# Patient Record
Sex: FEMALE | Race: WHITE | Employment: FULL TIME | URBAN - METROPOLITAN AREA
[De-identification: names, ages, dates, MRNs, and addresses within clinical notes are randomized per-mention and may not be internally consistent; named-entity substitution may affect disease eponyms.]

---

## 2017-01-24 PROBLEM — B99.9 INFECTION: Status: ACTIVE | Noted: 2017-01-24

## 2017-01-24 PROBLEM — L97.919 STASIS ULCER OF LOWER EXTREMITY, RIGHT (HCC): Status: ACTIVE | Noted: 2017-01-24

## 2017-01-24 PROBLEM — L03.115 CELLULITIS OF LEG WITHOUT FOOT, RIGHT: Status: ACTIVE | Noted: 2017-01-24

## 2017-01-24 PROBLEM — I83.019 STASIS ULCER OF LOWER EXTREMITY, RIGHT (HCC): Status: ACTIVE | Noted: 2017-01-24

## 2017-01-24 PROBLEM — F17.210 CIGARETTE SMOKER ONE HALF PACK A DAY OR LESS: Status: ACTIVE | Noted: 2017-01-24

## 2017-01-24 PROBLEM — L90.5: Status: ACTIVE | Noted: 2017-01-24

## 2017-01-31 PROBLEM — L97.222 ULCER OF LEFT CALF WITH FAT LAYER EXPOSED (HCC): Status: ACTIVE | Noted: 2017-01-31

## 2017-02-22 ENCOUNTER — LAB ENCOUNTER (OUTPATIENT)
Dept: LAB | Age: 67
End: 2017-02-22
Attending: INTERNAL MEDICINE
Payer: COMMERCIAL

## 2017-02-22 DIAGNOSIS — I83.019 STASIS ULCER OF LOWER EXTREMITY, RIGHT (HCC): ICD-10-CM

## 2017-02-22 DIAGNOSIS — L97.919 STASIS ULCER OF LOWER EXTREMITY, RIGHT (HCC): ICD-10-CM

## 2017-02-22 DIAGNOSIS — L97.222 ULCER OF LEFT CALF WITH FAT LAYER EXPOSED (HCC): ICD-10-CM

## 2017-02-22 DIAGNOSIS — L90.5 FIBROSIS OF SKIN OF LOWER EXTREMITY: ICD-10-CM

## 2017-02-22 DIAGNOSIS — L03.115 CELLULITIS OF LEG WITHOUT FOOT, RIGHT: ICD-10-CM

## 2017-02-22 PROCEDURE — 87075 CULTR BACTERIA EXCEPT BLOOD: CPT

## 2017-02-22 PROCEDURE — 87070 CULTURE OTHR SPECIMN AEROBIC: CPT

## 2017-02-22 PROCEDURE — 87147 CULTURE TYPE IMMUNOLOGIC: CPT

## 2017-02-22 PROCEDURE — 87205 SMEAR GRAM STAIN: CPT

## 2017-02-22 PROCEDURE — 87186 SC STD MICRODIL/AGAR DIL: CPT

## 2017-03-08 PROBLEM — A49.8 PSEUDOMONAS AERUGINOSA INFECTION: Status: ACTIVE | Noted: 2017-03-08

## 2017-06-06 PROBLEM — L97.312 ULCER OF RIGHT ANKLE, WITH FAT LAYER EXPOSED (HCC): Status: ACTIVE | Noted: 2017-06-06

## 2017-06-22 ENCOUNTER — LAB ENCOUNTER (OUTPATIENT)
Dept: LAB | Age: 67
End: 2017-06-22
Attending: NURSE PRACTITIONER
Payer: COMMERCIAL

## 2017-06-22 DIAGNOSIS — L03.119 CELLULITIS AND ABSCESS OF LEG: ICD-10-CM

## 2017-06-22 DIAGNOSIS — L02.419 CELLULITIS AND ABSCESS OF LEG: ICD-10-CM

## 2017-06-22 PROCEDURE — 87075 CULTR BACTERIA EXCEPT BLOOD: CPT

## 2017-06-22 PROCEDURE — 87070 CULTURE OTHR SPECIMN AEROBIC: CPT

## 2017-06-22 PROCEDURE — 87147 CULTURE TYPE IMMUNOLOGIC: CPT

## 2017-06-22 PROCEDURE — 87186 SC STD MICRODIL/AGAR DIL: CPT

## 2017-06-22 PROCEDURE — 87205 SMEAR GRAM STAIN: CPT

## 2017-08-24 ENCOUNTER — LAB ENCOUNTER (OUTPATIENT)
Dept: LAB | Age: 67
End: 2017-08-24
Attending: NURSE PRACTITIONER
Payer: COMMERCIAL

## 2017-08-24 DIAGNOSIS — L03.818 CELLULITIS OF OTHER SPECIFIED SITE: ICD-10-CM

## 2017-08-24 PROCEDURE — 87205 SMEAR GRAM STAIN: CPT

## 2017-08-24 PROCEDURE — 87070 CULTURE OTHR SPECIMN AEROBIC: CPT

## 2017-08-24 PROCEDURE — 87186 SC STD MICRODIL/AGAR DIL: CPT

## 2017-08-24 PROCEDURE — 87077 CULTURE AEROBIC IDENTIFY: CPT

## 2017-08-24 PROCEDURE — 87147 CULTURE TYPE IMMUNOLOGIC: CPT

## 2017-08-24 PROCEDURE — 87075 CULTR BACTERIA EXCEPT BLOOD: CPT

## 2018-08-28 ENCOUNTER — HOSPITAL (OUTPATIENT)
Dept: OTHER | Age: 68
End: 2018-08-28
Attending: INTERNAL MEDICINE

## 2018-08-29 ENCOUNTER — CHARTING TRANS (OUTPATIENT)
Dept: OTHER | Age: 68
End: 2018-08-29

## 2018-09-01 ENCOUNTER — HOSPITAL (OUTPATIENT)
Dept: OTHER | Age: 68
End: 2018-09-01
Attending: SURGERY

## 2018-09-07 ENCOUNTER — LAB SERVICES (OUTPATIENT)
Dept: OTHER | Age: 68
End: 2018-09-07

## 2018-09-13 LAB
ANER/AEROBE CUL/SMR (AANC) HL: NORMAL
CEFEPIME: NORMAL
CEFTAZIDIME: NORMAL
CEFTRIAXONE SUSC ISLT: NORMAL
CIPROFLOXACIN SUSC ISLT: >0.5
CIPROFLOXACIN SUSC ISLT: NORMAL
ERYTHROMYCIN SUSC ISLT: >0.5
GENTAMICIN: NORMAL
IMIPENEM SUSC ISLT: NORMAL
LEVOFLOXACIN SUSC ISLT: NORMAL
MEROPENEM: NORMAL
PENICILLIN G SUSC ISLT: NORMAL
PIP+TAZO SUSC ISLT: NORMAL
TMP SMX SUSC ISLT: NORMAL
TOBRAMYCIN: NORMAL
VANCOMYCIN SUSC ISLT: NORMAL

## 2019-01-01 ENCOUNTER — TELEPHONE (OUTPATIENT)
Dept: WOUND CARE | Facility: HOSPITAL | Age: 69
End: 2019-01-01

## 2019-01-01 ENCOUNTER — OFFICE VISIT (OUTPATIENT)
Dept: WOUND CARE | Facility: HOSPITAL | Age: 69
End: 2019-01-01
Attending: INTERNAL MEDICINE
Payer: COMMERCIAL

## 2019-01-01 ENCOUNTER — APPOINTMENT (OUTPATIENT)
Dept: WOUND CARE | Facility: HOSPITAL | Age: 69
End: 2019-01-01
Attending: CLINICAL NURSE SPECIALIST
Payer: COMMERCIAL

## 2019-01-01 DIAGNOSIS — L97.222 ULCER OF LEFT CALF WITH FAT LAYER EXPOSED (HCC): ICD-10-CM

## 2019-01-01 DIAGNOSIS — L97.919 STASIS ULCER OF LOWER EXTREMITY, RIGHT (HCC): Primary | ICD-10-CM

## 2019-01-01 DIAGNOSIS — I83.019 STASIS ULCER OF LOWER EXTREMITY, RIGHT (HCC): Primary | ICD-10-CM

## 2019-01-01 PROCEDURE — 87070 CULTURE OTHR SPECIMN AEROBIC: CPT | Performed by: NURSE PRACTITIONER

## 2019-01-01 PROCEDURE — 87205 SMEAR GRAM STAIN: CPT | Performed by: NURSE PRACTITIONER

## 2019-01-01 PROCEDURE — 97598 DBRDMT OPN WND ADDL 20CM/<: CPT

## 2019-01-01 PROCEDURE — 87075 CULTR BACTERIA EXCEPT BLOOD: CPT | Performed by: NURSE PRACTITIONER

## 2019-01-01 PROCEDURE — 99214 OFFICE O/P EST MOD 30 MIN: CPT

## 2019-01-01 PROCEDURE — 87077 CULTURE AEROBIC IDENTIFY: CPT | Performed by: NURSE PRACTITIONER

## 2019-01-01 PROCEDURE — 87184 SC STD DISK METHOD PER PLATE: CPT | Performed by: NURSE PRACTITIONER

## 2019-01-01 PROCEDURE — 97597 DBRDMT OPN WND 1ST 20 CM/<: CPT

## 2019-01-01 PROCEDURE — 87186 SC STD MICRODIL/AGAR DIL: CPT | Performed by: NURSE PRACTITIONER

## 2019-02-06 NOTE — PROGRESS NOTES
If wounds not improving on PO cefdinir, then likely switch to PO cipro vs other. If improving these are likely colonizers.
06-Feb-2019

## 2019-03-19 PROCEDURE — 87075 CULTR BACTERIA EXCEPT BLOOD: CPT | Performed by: INTERNAL MEDICINE

## 2019-03-19 PROCEDURE — 87077 CULTURE AEROBIC IDENTIFY: CPT | Performed by: INTERNAL MEDICINE

## 2019-03-19 PROCEDURE — 87070 CULTURE OTHR SPECIMN AEROBIC: CPT | Performed by: INTERNAL MEDICINE

## 2019-03-19 PROCEDURE — 87205 SMEAR GRAM STAIN: CPT | Performed by: INTERNAL MEDICINE

## 2019-03-19 PROCEDURE — 87186 SC STD MICRODIL/AGAR DIL: CPT | Performed by: INTERNAL MEDICINE

## 2019-03-19 PROCEDURE — 87076 CULTURE ANAEROBE IDENT EACH: CPT | Performed by: INTERNAL MEDICINE

## 2019-04-19 ENCOUNTER — APPOINTMENT (OUTPATIENT)
Dept: WOUND CARE | Facility: HOSPITAL | Age: 69
End: 2019-04-19
Attending: CLINICAL NURSE SPECIALIST
Payer: COMMERCIAL

## 2019-05-03 ENCOUNTER — APPOINTMENT (OUTPATIENT)
Dept: WOUND CARE | Facility: HOSPITAL | Age: 69
End: 2019-05-03
Attending: INTERNAL MEDICINE
Payer: COMMERCIAL

## 2019-05-31 NOTE — PROGRESS NOTES
Subjective    Chief Complaint  This information was obtained from the patient  The patient is new to the [address] [facility],[address] here for an initial visit for the evaluation and management of non-healing wound(s).     Allergies  no known allergies    HPI  This informa Constitutional Symptoms (General Health):  Fatigue, Fever, Weakness  Respiratory: Cough, Shortness of Breath, Wheezing  Cardiovascular (Central/Peripheral): Chest Pain  Gastrointestinal (GI): Change in Bowel Habits  Genitourinary (): Urinary Incontinence Wound #1 Right, Anterior Lower Leg is a chronic Full Thickness Venous Ulcer and has received a status of Not Healed.  Initial wound encounter measurements are 5.6cm length x 4.7cm width x 0.1cm depth, with an area of 26.32 sq cm and a volume of 2.632 cubic Wound #3 Left, Dorsal Foot (mid) is a chronic Full Thickness Venous Ulcer and has received a status of Not Healed. Initial wound encounter measurements are 2.6cm length x 2cm width x 0.1cm depth, with an area of 5.2 sq cm and a volume of 0.52 cubic cm.  The (Encounter Diagnosis) S81.802D - Unspecified open wound, left lower leg, subsequent encounter  (Encounter Diagnosis) I87.323 - Chronic venous hypertension (idiopathic) with inflammation of bilateral lower extremity  (Encounter Diagnosis) I87.2 - Venous ins Wound #1 (Venous Ulcer) is located on the right, anterior lower leg. A selective debridement with a total area debrided of 5.264 sq cm was performed by Sharmila Mancini NP. to remove devitalized tissue: biofilm, fibrin, and slough.  The following instrument(s Wound #2 (Venous Ulcer) is located on the left lower leg. A Multi Layer Compression Wrap procedure was performed for the lower left extremity by Rosamaria Alfonso RN. Garon Agrawal was applied with .  The procedure was tolerated well with pain level Other: - Keep comprilan compression wraps clean and dry    Additional Orders:     Follow-Up Appointments  Return Appointment: - APN visit 30 minute visit   RN visit for assessment of wound(s): - RN visit 60 minute visit    Follow-Up Appointments:  A follow- Patient Name: Lorin Jang   Patient Number: [id number]  Patient YOB: 1950  Date: 5/31/2019  RN: Delfin Young  Physician / Extender: [address] [place] [address] Rd: Outpatient  Debridement Performed for Assessment: Wound #2 Left Lower Leg  Perfor Post Procedural Pain: 0  Response to Treatment: Procedure was tolerated well  Compression Layers: Multi-Layer  Compression Product Type: Comprilan  Extremity Location: Lower Left Extremity    Electronic Signature(s)  Signed By: Eh Prakash 05/31/

## 2019-06-12 NOTE — TELEPHONE ENCOUNTER
Call placed to Dr Ann Jane office to inform then that patient has been a no call no show for the last 2 appointments. Attempted to contact the patient but she has not answered her phone or returned the phone call.

## 2020-01-01 ENCOUNTER — EXTERNAL FACILITY (OUTPATIENT)
Dept: INTERNAL MEDICINE CLINIC | Facility: CLINIC | Age: 70
End: 2020-01-01

## 2020-01-01 DIAGNOSIS — N30.00 ACUTE CYSTITIS WITHOUT HEMATURIA: ICD-10-CM

## 2020-01-01 DIAGNOSIS — F17.210 CIGARETTE SMOKER ONE HALF PACK A DAY OR LESS: ICD-10-CM

## 2020-01-01 DIAGNOSIS — L03.115 CELLULITIS OF LEG WITHOUT FOOT, RIGHT: ICD-10-CM

## 2020-01-01 DIAGNOSIS — L03.818 CELLULITIS OF OTHER SPECIFIED SITE: ICD-10-CM

## 2020-01-01 DIAGNOSIS — D50.8 OTHER IRON DEFICIENCY ANEMIA: ICD-10-CM

## 2020-01-01 DIAGNOSIS — D72.829 LEUKOCYTOSIS, UNSPECIFIED TYPE: ICD-10-CM

## 2020-01-01 DIAGNOSIS — Z89.612 S/P AKA (ABOVE KNEE AMPUTATION) UNILATERAL, LEFT (HCC): ICD-10-CM

## 2020-01-01 DIAGNOSIS — I83.019 STASIS ULCER OF LOWER EXTREMITY, RIGHT (HCC): ICD-10-CM

## 2020-01-01 DIAGNOSIS — Z89.612 S/P AKA (ABOVE KNEE AMPUTATION), LEFT (HCC): ICD-10-CM

## 2020-01-01 DIAGNOSIS — S78.112A UNILATERAL AKA, LEFT (HCC): ICD-10-CM

## 2020-01-01 DIAGNOSIS — I48.0 PAROXYSMAL ATRIAL FIBRILLATION (HCC): ICD-10-CM

## 2020-01-01 DIAGNOSIS — I10 ESSENTIAL HYPERTENSION: ICD-10-CM

## 2020-01-01 DIAGNOSIS — R09.02 HYPOXIA: ICD-10-CM

## 2020-01-01 DIAGNOSIS — L97.919 STASIS ULCER OF LOWER EXTREMITY, RIGHT (HCC): ICD-10-CM

## 2020-01-01 PROCEDURE — 99309 SBSQ NF CARE MODERATE MDM 30: CPT | Performed by: INTERNAL MEDICINE

## 2020-01-01 PROCEDURE — 99306 1ST NF CARE HIGH MDM 50: CPT | Performed by: INTERNAL MEDICINE

## 2020-01-01 PROCEDURE — 99310 SBSQ NF CARE HIGH MDM 45: CPT | Performed by: INTERNAL MEDICINE

## 2020-05-08 NOTE — PROGRESS NOTES
telemedicine , video audio visit 5/7/202    73 y/o female was admitted to [facility] with unable to bear weight on left lower extremity on 4/29/20. recently she was discharged from hospital on 4/25/20 with non healing rt foot ulcer.  Hx of PVD bi

## 2020-05-09 NOTE — PROGRESS NOTES
telemedicine , video audio visit, nurse at the bedside   follow up elevated wbc         [facility] : pvd, htn, ckd , copd, PAF     allergies : per chart    medication reviewed    ros : she does report pain, no sob, no headache , no dizziness, no chest pain, no pa

## 2020-05-12 NOTE — PROGRESS NOTES
telemedicine , video audio visit, nurse at the bedside   follow up elevated wbc , low hb, uti         [facility] : pvd, htn, ckd , copd, PAF     allergies : per chart    medication reviewed    ros : she does report pain, no sob, no headache , no dizziness, no susan

## 2020-05-20 NOTE — PROGRESS NOTES
telemedicine , video audio visit seen 5/19/2020    73 y/o female was admitted to [facility]  with unable to bear weight on left lower extremity on 4/29/20. recently she was discharged from hospital on 4/25/20 with non healing rt foot ulcer.  Hx of PVD bilater

## 2020-05-21 NOTE — PROGRESS NOTES
telemedicine , video audio visit          [facility] : pvd, htn, ckd , copd, PAF     allergies : per chart    medication reviewed    ros : she doesn report pain, no sob, no headache , no dizziness, no chest pain, no palpitation, no nausea or vomting   physical: s

## 2020-05-23 NOTE — PROGRESS NOTES
telemedicine , video audio visit  seen 5/22  follow up , called by nurse she is desaturating , her hb dropped to 6.3          [facility] : pvd, htn, ckd , copd, PAF     allergies : per chart    medication reviewed    ros : she doesn report pain, no sob, no headac